# Patient Record
Sex: FEMALE
[De-identification: names, ages, dates, MRNs, and addresses within clinical notes are randomized per-mention and may not be internally consistent; named-entity substitution may affect disease eponyms.]

---

## 2018-02-28 ENCOUNTER — HOSPITAL ENCOUNTER (OUTPATIENT)
Dept: HOSPITAL 5 - SPVWC | Age: 48
Discharge: HOME | End: 2018-02-28
Attending: FAMILY MEDICINE
Payer: COMMERCIAL

## 2018-02-28 DIAGNOSIS — Z12.31: Primary | ICD-10-CM

## 2018-02-28 PROCEDURE — 77067 SCR MAMMO BI INCL CAD: CPT

## 2018-03-02 NOTE — MAMMOGRAPHY REPORT
BILATERAL DIGITAL SCREENING MAMMOGRAM with CAD: 02/28/18 15:29:00



CLINICAL: Baseline screening.



FINDINGS: The breasts are heterogeneously dense, which may obscure 

small masses.  Bilateral asymmetries require additional imaging.  The 

left outer asymmetry is identified on the exaggerated CC but not on the 

regular CC view.  No architectural distortion or suspicious 

calcifications.



IMPRESSION: Bilateral asymmetries requiring further workup.



BI-RADS CATEGORY:  0 -- Additional Imaging Evaluation Required



RECOMMENDATION: Recall for  bilateral , spot compression   views and 

bilateral breast ultrasound if needed.



ACR BI-RADS MAMMOGRAPHIC CODES:

0 = Needs additional imaging evaluation; 1 = Negative; 2 = Benign; 3 = 

Probably benign; 4 = Suspicious; 5 = Malignant; 6 = Known biopsy-proven 

malignancy



COMMENT:

      1.   Dense breast tissue, i.e., adenosis, fibrocystic 

            changes, etc., may obscure an underlying neoplasm.

      2.   Approximately 10% of cancers are not detected with

            mammography.

      3.   A negative mammography report should not delay biopsy 

            if a clinically suspicious mass is present.



COMMENT:

Patient follow-up letters are generated via our [employer] application.

## 2021-10-15 ENCOUNTER — OFFICE VISIT (OUTPATIENT)
Dept: URBAN - METROPOLITAN AREA CLINIC 109 | Facility: CLINIC | Age: 51
End: 2021-10-15
Payer: COMMERCIAL

## 2021-10-15 ENCOUNTER — WEB ENCOUNTER (OUTPATIENT)
Dept: URBAN - METROPOLITAN AREA CLINIC 109 | Facility: CLINIC | Age: 51
End: 2021-10-15

## 2021-10-15 ENCOUNTER — DASHBOARD ENCOUNTERS (OUTPATIENT)
Age: 51
End: 2021-10-15

## 2021-10-15 VITALS
TEMPERATURE: 97.7 F | HEIGHT: 62 IN | WEIGHT: 199.4 LBS | HEART RATE: 94 BPM | SYSTOLIC BLOOD PRESSURE: 157 MMHG | DIASTOLIC BLOOD PRESSURE: 86 MMHG | BODY MASS INDEX: 36.7 KG/M2

## 2021-10-15 DIAGNOSIS — Z12.11 SCREEN FOR COLON CANCER: ICD-10-CM

## 2021-10-15 PROCEDURE — 99202 OFFICE O/P NEW SF 15 MIN: CPT | Performed by: INTERNAL MEDICINE

## 2021-11-04 ENCOUNTER — OFFICE VISIT (OUTPATIENT)
Dept: URBAN - METROPOLITAN AREA SURGERY CENTER 23 | Facility: SURGERY CENTER | Age: 51
End: 2021-11-04
Payer: COMMERCIAL

## 2021-11-04 ENCOUNTER — CLAIMS CREATED FROM THE CLAIM WINDOW (OUTPATIENT)
Dept: URBAN - METROPOLITAN AREA CLINIC 4 | Facility: CLINIC | Age: 51
End: 2021-11-04
Payer: COMMERCIAL

## 2021-11-04 DIAGNOSIS — Z12.11 COLON CANCER SCREENING: ICD-10-CM

## 2021-11-04 DIAGNOSIS — K63.5 BENIGN COLON POLYP: ICD-10-CM

## 2021-11-04 DIAGNOSIS — K63.89 POLYP, HYPERPLASTIC: ICD-10-CM

## 2021-11-04 PROCEDURE — 88305 TISSUE EXAM BY PATHOLOGIST: CPT | Performed by: PATHOLOGY

## 2021-11-04 PROCEDURE — 45385 COLONOSCOPY W/LESION REMOVAL: CPT | Performed by: INTERNAL MEDICINE

## 2021-11-04 PROCEDURE — G8907 PT DOC NO EVENTS ON DISCHARG: HCPCS | Performed by: INTERNAL MEDICINE

## 2021-11-04 PROCEDURE — 45380 COLONOSCOPY AND BIOPSY: CPT | Performed by: INTERNAL MEDICINE

## 2024-07-26 ENCOUNTER — OFFICE VISIT (OUTPATIENT)
Dept: URBAN - METROPOLITAN AREA CLINIC 109 | Facility: CLINIC | Age: 54
End: 2024-07-26
Payer: COMMERCIAL

## 2024-07-26 ENCOUNTER — OFFICE VISIT (OUTPATIENT)
Dept: URBAN - METROPOLITAN AREA CLINIC 109 | Facility: CLINIC | Age: 54
End: 2024-07-26

## 2024-07-26 VITALS
HEIGHT: 62 IN | WEIGHT: 200.6 LBS | DIASTOLIC BLOOD PRESSURE: 99 MMHG | SYSTOLIC BLOOD PRESSURE: 162 MMHG | BODY MASS INDEX: 36.91 KG/M2 | HEART RATE: 83 BPM | TEMPERATURE: 97.7 F

## 2024-07-26 DIAGNOSIS — K62.5 RECTAL BLEEDING: ICD-10-CM

## 2024-07-26 DIAGNOSIS — K59.09 OTHER CONSTIPATION: ICD-10-CM

## 2024-07-26 PROBLEM — 14760008: Status: ACTIVE | Noted: 2024-07-26

## 2024-07-26 PROCEDURE — 99213 OFFICE O/P EST LOW 20 MIN: CPT | Performed by: INTERNAL MEDICINE

## 2024-07-26 NOTE — HPI-TODAY'S VISIT:
Pt is a 54 y/o F who presents with constipation x 6 months. Reports one episode of BRBPR after BM w/ significant straining. Has not happened since. Patient reports taking stool softener with no relief. Denies rectal pain, abd pain, N/V/D, melena, unintentional wt loss. Pt has a hx of hemorrhoids seen on colonoscopy. Reports low fiber diet. Pt d/c HCTZ ~6 months ago. Had teeth pulled and dentured placed last month. Otherwise patient reports no changes in meds or significant stress. Denies FHx of colon polyps/CA.  11/2021: Last colonoscopy showed two hyperplastic polyps, tics, internal hemorrhoids, otherwise nml. Repeat 5 years.

## 2024-09-26 ENCOUNTER — OFFICE VISIT (OUTPATIENT)
Dept: URBAN - METROPOLITAN AREA CLINIC 109 | Facility: CLINIC | Age: 54
End: 2024-09-26

## 2024-10-22 ENCOUNTER — OFFICE VISIT (OUTPATIENT)
Dept: URBAN - METROPOLITAN AREA CLINIC 109 | Facility: CLINIC | Age: 54
End: 2024-10-22
Payer: COMMERCIAL

## 2024-10-22 VITALS
WEIGHT: 201 LBS | TEMPERATURE: 97.2 F | BODY MASS INDEX: 36.99 KG/M2 | HEIGHT: 62 IN | DIASTOLIC BLOOD PRESSURE: 92 MMHG | HEART RATE: 80 BPM | SYSTOLIC BLOOD PRESSURE: 154 MMHG

## 2024-10-22 DIAGNOSIS — K59.00 CONSTIPATION, UNSPECIFIED CONSTIPATION TYPE: ICD-10-CM

## 2024-10-22 DIAGNOSIS — K62.5 RECTAL BLEEDING: ICD-10-CM

## 2024-10-22 PROCEDURE — 99242 OFF/OP CONSLTJ NEW/EST SF 20: CPT | Performed by: INTERNAL MEDICINE

## 2024-10-22 PROCEDURE — 99212 OFFICE O/P EST SF 10 MIN: CPT | Performed by: INTERNAL MEDICINE

## 2024-10-22 NOTE — HPI-TODAY'S VISIT:
Patient is a 53 y/o F who presents for follow up. Patient reports she's doing great. Denies any GI concerns today. She has added Metamucil to her daily regimen and "finally feels regular." BM daily w/o straining. Denies LGI sxs. Has not had BRBPR since last visit. No UGI sxs.  UTD with colon screening. Due in 2026. - - - - - - - - - - - - - - - - - - - - - - 7/26/2024 (ETTA [facility]acheco) Pt is a 54 y/o F who presents with constipation x 6 months. Reports one episode of BRBPR after BM w/ significant straining. Has not happened since. Patient reports taking stool softener with no relief. Denies rectal pain, abd pain, N/V/D, melena, unintentional wt loss. Pt has a hx of hemorrhoids seen on colonoscopy. Reports low fiber diet. Pt d/c HCTZ ~6 months ago. Had teeth pulled and dentured placed last month. Otherwise patient reports no changes in meds or significant stress. Denies FHx of colon polyps/CA.  11/2021: Last colonoscopy showed two hyperplastic polyps, tics, internal hemorrhoids, otherwise nml. Repeat 5 years.